# Patient Record
Sex: MALE | Race: OTHER | HISPANIC OR LATINO | URBAN - METROPOLITAN AREA
[De-identification: names, ages, dates, MRNs, and addresses within clinical notes are randomized per-mention and may not be internally consistent; named-entity substitution may affect disease eponyms.]

---

## 2020-03-23 ENCOUNTER — EMERGENCY (EMERGENCY)
Facility: HOSPITAL | Age: 60
LOS: 0 days | Discharge: HOME | End: 2020-03-23
Payer: SELF-PAY

## 2020-03-23 VITALS
DIASTOLIC BLOOD PRESSURE: 76 MMHG | OXYGEN SATURATION: 95 % | HEART RATE: 93 BPM | RESPIRATION RATE: 18 BRPM | SYSTOLIC BLOOD PRESSURE: 135 MMHG | TEMPERATURE: 100 F

## 2020-03-23 DIAGNOSIS — R09.89 OTHER SPECIFIED SYMPTOMS AND SIGNS INVOLVING THE CIRCULATORY AND RESPIRATORY SYSTEMS: ICD-10-CM

## 2020-03-23 DIAGNOSIS — R05 COUGH: ICD-10-CM

## 2020-03-23 DIAGNOSIS — B97.29 OTHER CORONAVIRUS AS THE CAUSE OF DISEASES CLASSIFIED ELSEWHERE: ICD-10-CM

## 2020-03-23 DIAGNOSIS — Z88.0 ALLERGY STATUS TO PENICILLIN: ICD-10-CM

## 2020-03-23 DIAGNOSIS — R50.9 FEVER, UNSPECIFIED: ICD-10-CM

## 2020-03-23 DIAGNOSIS — J06.9 ACUTE UPPER RESPIRATORY INFECTION, UNSPECIFIED: ICD-10-CM

## 2020-03-23 PROCEDURE — 99283 EMERGENCY DEPT VISIT LOW MDM: CPT

## 2020-03-23 PROCEDURE — U0001: CPT

## 2020-03-23 NOTE — ED STATDOCS - NS ED ROS FT
ROS: Constitutional- +fever, +chills.  Respiratory- +cough, -SOB  Cardiac- no chest pain, no palpitations, ENT- +rhinorrhea, no sore throat, no congestion.  Abdomen- No nausea, no vomiting, no diarrhea.  Urinary- no dysuria, no urgency, no frequency.  Skin- No rashes

## 2020-03-23 NOTE — ED STATDOCS - PATIENT PORTAL LINK FT
You can access the FollowMyHealth Patient Portal offered by [facility] by registering at the following website: [email or web address]. By joining [employer]’s FollowMyHealth portal, you will also be able to view your health information using other applications (apps) compatible with our system.

## 2020-03-23 NOTE — ED STATDOCS - NSFOLLOWUPINSTRUCTIONS_ED_ALL_ED_FT
How to get your Coronavirus (COVID-19) Testing Results:   Please be advised that you were tested for the coronavirus (COVID-19) in the Emergency Department at [facility].  You are to maintain self-quarantine procedures for 14 days until instructed otherwise by one of our healthcare agents. Please note that the test may take up to 2-4 days to result.  If you do not hear from us within 72 hours and you'd like to check on your results, you can call on of our coronavirus specialists at [address] [place] [address] (available 24/7).  Please DO NOT call the site where you received the test to obtain your results.     Return to the ED for worsening sx including SOB

## 2020-03-23 NOTE — ED STATDOCS - OBJECTIVE STATEMENT
Pt presents to ED with fever, cough, runny nose, body aches, sore throat x 4 days. Pt recently exposed to COVID-19. Pt here for testing.

## 2020-03-24 LAB — SARS-COV-2 RNA SPEC QL NAA+PROBE: DETECTED

## 2020-07-09 ENCOUNTER — EMERGENCY (EMERGENCY)
Facility: HOSPITAL | Age: 60
LOS: 0 days | Discharge: HOME | End: 2020-07-09
Attending: EMERGENCY MEDICINE
Payer: SELF-PAY

## 2020-07-09 VITALS
HEART RATE: 74 BPM | DIASTOLIC BLOOD PRESSURE: 76 MMHG | OXYGEN SATURATION: 97 % | TEMPERATURE: 98 F | RESPIRATION RATE: 18 BRPM | SYSTOLIC BLOOD PRESSURE: 134 MMHG

## 2020-07-09 VITALS — WEIGHT: 179.9 LBS

## 2020-07-09 DIAGNOSIS — R07.89 OTHER CHEST PAIN: ICD-10-CM

## 2020-07-09 DIAGNOSIS — R11.2 NAUSEA WITH VOMITING, UNSPECIFIED: ICD-10-CM

## 2020-07-09 DIAGNOSIS — Z88.0 ALLERGY STATUS TO PENICILLIN: ICD-10-CM
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
## 2020-07-09 DIAGNOSIS — E11.9 TYPE 2 DIABETES MELLITUS WITHOUT COMPLICATIONS: ICD-10-CM
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
## 2020-07-09 DIAGNOSIS — Z11.59 ENCOUNTER FOR SCREENING FOR OTHER VIRAL DISEASES: ICD-10-CM

## 2020-07-09 DIAGNOSIS — I10 ESSENTIAL (PRIMARY) HYPERTENSION: ICD-10-CM

## 2020-07-09 DIAGNOSIS — T18.9XXA FOREIGN BODY OF ALIMENTARY TRACT, PART UNSPECIFIED, INITIAL ENCOUNTER: ICD-10-CM

## 2020-07-09 LAB
ALBUMIN SERPL ELPH-MCNC: 3.8 G/DL — SIGNIFICANT CHANGE UP (ref 3.3–5)
ALP SERPL-CCNC: 94 U/L — SIGNIFICANT CHANGE UP (ref 40–120)
ALT FLD-CCNC: 24 U/L — SIGNIFICANT CHANGE UP (ref 12–78)
ANION GAP SERPL CALC-SCNC: 5 MMOL/L — SIGNIFICANT CHANGE UP (ref 5–17)
AST SERPL-CCNC: 17 U/L — SIGNIFICANT CHANGE UP (ref 15–37)
BASOPHILS # BLD AUTO: 0.02 K/UL — SIGNIFICANT CHANGE UP (ref 0–0.2)
BASOPHILS NFR BLD AUTO: 0.3 % — SIGNIFICANT CHANGE UP (ref 0–2)
BILIRUB SERPL-MCNC: 0.4 MG/DL — SIGNIFICANT CHANGE UP (ref 0.2–1.2)
BUN SERPL-MCNC: 13 MG/DL — SIGNIFICANT CHANGE UP (ref 7–23)
CALCIUM SERPL-MCNC: 9.3 MG/DL — SIGNIFICANT CHANGE UP (ref 8.5–10.1)
CHLORIDE SERPL-SCNC: 106 MMOL/L — SIGNIFICANT CHANGE UP (ref 96–108)
CO2 SERPL-SCNC: 27 MMOL/L — SIGNIFICANT CHANGE UP (ref 22–31)
CREAT SERPL-MCNC: 0.91 MG/DL — SIGNIFICANT CHANGE UP (ref 0.5–1.3)
EOSINOPHIL # BLD AUTO: 0.03 K/UL — SIGNIFICANT CHANGE UP (ref 0–0.5)
EOSINOPHIL NFR BLD AUTO: 0.5 % — SIGNIFICANT CHANGE UP (ref 0–6)
GLUCOSE SERPL-MCNC: 313 MG/DL — HIGH (ref 70–99)
HCT VFR BLD CALC: 50.8 % — HIGH (ref 39–50)
HGB BLD-MCNC: 17.5 G/DL — HIGH (ref 13–17)
IMM GRANULOCYTES NFR BLD AUTO: 0.2 % — SIGNIFICANT CHANGE UP (ref 0–1.5)
LYMPHOCYTES # BLD AUTO: 1.23 K/UL — SIGNIFICANT CHANGE UP (ref 1–3.3)
LYMPHOCYTES # BLD AUTO: 19.3 % — SIGNIFICANT CHANGE UP (ref 13–44)
MCHC RBC-ENTMCNC: 30.9 PG — SIGNIFICANT CHANGE UP (ref 27–34)
MCHC RBC-ENTMCNC: 34.4 GM/DL — SIGNIFICANT CHANGE UP (ref 32–36)
MCV RBC AUTO: 89.6 FL — SIGNIFICANT CHANGE UP (ref 80–100)
MONOCYTES # BLD AUTO: 0.38 K/UL — SIGNIFICANT CHANGE UP (ref 0–0.9)
MONOCYTES NFR BLD AUTO: 6 % — SIGNIFICANT CHANGE UP (ref 2–14)
NEUTROPHILS # BLD AUTO: 4.69 K/UL — SIGNIFICANT CHANGE UP (ref 1.8–7.4)
NEUTROPHILS NFR BLD AUTO: 73.7 % — SIGNIFICANT CHANGE UP (ref 43–77)
PLATELET # BLD AUTO: 196 K/UL — SIGNIFICANT CHANGE UP (ref 150–400)
POTASSIUM SERPL-MCNC: 4.2 MMOL/L — SIGNIFICANT CHANGE UP (ref 3.5–5.3)
POTASSIUM SERPL-SCNC: 4.2 MMOL/L — SIGNIFICANT CHANGE UP (ref 3.5–5.3)
PROT SERPL-MCNC: 7.8 GM/DL — SIGNIFICANT CHANGE UP (ref 6–8.3)
RBC # BLD: 5.67 M/UL — SIGNIFICANT CHANGE UP (ref 4.2–5.8)
RBC # FLD: 11.9 % — SIGNIFICANT CHANGE UP (ref 10.3–14.5)
SARS-COV-2 RNA SPEC QL NAA+PROBE: SIGNIFICANT CHANGE UP
SODIUM SERPL-SCNC: 138 MMOL/L — SIGNIFICANT CHANGE UP (ref 135–145)
WBC # BLD: 6.36 K/UL — SIGNIFICANT CHANGE UP (ref 3.8–10.5)
WBC # FLD AUTO: 6.36 K/UL — SIGNIFICANT CHANGE UP (ref 3.8–10.5)

## 2020-07-09 PROCEDURE — 96375 TX/PRO/DX INJ NEW DRUG ADDON: CPT

## 2020-07-09 PROCEDURE — 96361 HYDRATE IV INFUSION ADD-ON: CPT

## 2020-07-09 PROCEDURE — 99284 EMERGENCY DEPT VISIT MOD MDM: CPT

## 2020-07-09 PROCEDURE — 93005 ELECTROCARDIOGRAM TRACING: CPT

## 2020-07-09 PROCEDURE — 80053 COMPREHEN METABOLIC PANEL: CPT

## 2020-07-09 PROCEDURE — 96374 THER/PROPH/DIAG INJ IV PUSH: CPT

## 2020-07-09 PROCEDURE — 36415 COLL VENOUS BLD VENIPUNCTURE: CPT

## 2020-07-09 PROCEDURE — 87635 SARS-COV-2 COVID-19 AMP PRB: CPT

## 2020-07-09 PROCEDURE — 99284 EMERGENCY DEPT VISIT MOD MDM: CPT | Mod: 25

## 2020-07-09 PROCEDURE — 85025 COMPLETE CBC W/AUTO DIFF WBC: CPT

## 2020-07-09 PROCEDURE — 93010 ELECTROCARDIOGRAM REPORT: CPT

## 2020-07-09 RX ORDER — GLUCAGON INJECTION, SOLUTION 0.5 MG/.1ML
1 INJECTION, SOLUTION SUBCUTANEOUS ONCE
Refills: 0 | Status: COMPLETED | OUTPATIENT
Start: 2020-07-09 | End: 2020-07-09

## 2020-07-09 RX ORDER — SODIUM CHLORIDE 9 MG/ML
1000 INJECTION INTRAMUSCULAR; INTRAVENOUS; SUBCUTANEOUS ONCE
Refills: 0 | Status: COMPLETED | OUTPATIENT
Start: 2020-07-09 | End: 2020-07-09

## 2020-07-09 RX ORDER — ONDANSETRON 8 MG/1
4 TABLET, FILM COATED ORAL ONCE
Refills: 0 | Status: COMPLETED | OUTPATIENT
Start: 2020-07-09 | End: 2020-07-09

## 2020-07-09 RX ADMIN — ONDANSETRON 4 MILLIGRAM(S): 8 TABLET, FILM COATED ORAL at 17:19

## 2020-07-09 RX ADMIN — SODIUM CHLORIDE 2000 MILLILITER(S): 9 INJECTION INTRAMUSCULAR; INTRAVENOUS; SUBCUTANEOUS at 17:23

## 2020-07-09 RX ADMIN — GLUCAGON INJECTION, SOLUTION 1 MILLIGRAM(S): 0.5 INJECTION, SOLUTION SUBCUTANEOUS at 17:00

## 2020-07-09 RX ADMIN — SODIUM CHLORIDE 1000 MILLILITER(S): 9 INJECTION INTRAMUSCULAR; INTRAVENOUS; SUBCUTANEOUS at 18:08

## 2020-07-09 NOTE — ED ADULT NURSE NOTE - NS_ED_NURSE_TEACHING_TOPIC_ED_A_ED
Other specify/Pt. D/C as ordered. Pt. states to feel much better at this time. Pt. verbalizes understanding of F/U at [facility] for Diabetes management. Safety maintained

## 2020-07-09 NOTE — ED STATDOCS - NSFOLLOWUPINSTRUCTIONS_ED_ALL_ED_FT
Please follow up with your Primary MD in 1-2 days. Follow up with GI as soon as possible. Return to ED immediately for any new or concerning symptoms or worsening symptoms.     Ingestión de cuerpos extraños en los adultos  Swallowed Foreign Body, Adult     La ingestión de un cuerpo extraño es el acto de tragar un objeto que queda atascado en el tubo digestivo, ya sea en la parte del organismo que mueve los alimentos desde la boca hasta el estómago (esófago) o en otra parte. Cuando zane persona ingiere un objeto, llega hasta el esófago. El lugar más estrecho del aparato digestivo es el punto donde el esófago se une con el estómago. Si el objeto puede atravesar latanya lugar, generalmente avanzará a través del dario del aparato digestivo sin causar problemas. Si un cuerpo extraño queda atascado, ivan vez sea necesario extraerlo.  Zane persona puede ingerir un cuerpo extraño de manera accidental o deliberada. Es muy importante que le informe al médico qué es lo que badillo ingerido. Algunos objetos que se tragan pueden ser potencialmente mortales y puede ser necesario un tratamiento de emergencia. Los cuerpos extraños peligrosos que se ingieren incluyen lo siguiente:  Objetos que quedan atascados en la garganta.Objetos que impiden que pueda tragar.Objetos que dificultan la respiración.Objetos filosos.Objetos nocivos o venenosos (tóxicos), lyndsay las pilas o las drogas ilegales.¿Cuáles son las causas?  El cuerpo extraño que se ingiere con mayor frecuencia es comida que no pasará a través del esófago hasta llegar al estómago (impactación alimentaria). Habitualmente, entre los alimentos que quedan retenidos se incluyen las denise y los vegetales duros, lyndsay las zanahorias y los rábanos.  Otros cuerpos extraños que se ingieren con frecuencia incluyen lo siguiente:  Trozos de hueso de la carne o espinas del pescado.Palillos de dientes.Dentaduras postizas.¿Qué incrementa el riesgo?  Es más probable que ingieran un cuerpo extraño las personas que:  Usan dentaduras postizas.Bravo tomado alcohol o consumido drogas.Tienen zane afección de malathi mental.Tienen dificultades con el pensamiento y el aprendizaje (deterioro cognitivo).Tienen zane mignon estrecha o con tejido cicatricial en el tubo digestivo.¿Cuáles son los signos o los síntomas?  Los síntomas de esta afección incluyen:  Dolor u opresión en la garganta o en el pecho.Imposibilidad de tragar alimentos o líquidos.Imposibilidad de tragar la saliva.Babeo.Asfixia.Voz ronca.Dificultad para respirar.Respiración ruidosa.Vómitos con shahnaz.¿Cómo se diagnostica?  Esta afección puede diagnosticarse en función de los síntomas y los antecedentes médicos. El médico hará un examen físico para confirmar el diagnóstico y encontrar el objeto. Se puede usar un detector de metales para hallar los objetos de metal. También pueden realizarse estudios de diagnóstico por imágenes, entre ellos:  Radiografías.Zane exploración por tomografía computarizada (TC).Es posible que algunos objetos no se visualicen en los estudios de diagnóstico por imágenes. En esos casos, se puede realizar un examen o procedimiento en el que se usa un instrumento para observar el interior del esófago (endoscopio). El tubo (endoscopio) que se emplea para ronny examen puede ser rígido o flexible, en función del lugar donde está atascado el cuerpo extraño.  ¿Cómo se trata?  Generalmente, un objeto que ha llegado hasta el estómago xochitl no es peligroso atravesará el aparato digestivo sin tratamiento. Si el objeto ingerido no es peligroso xochitl está atascado en el esófago:  El médico puede aspirarlo suavemente y sacarlo a través de la boca.Pueden administrarle medicamentos para relajar los músculos del esófago y permitir el paso del objeto.Se puede realizar zane endoscopia para encontrar y extraer el objeto si no pasa con medicamentos. El médico introducirá los instrumentos médicos a través del endoscopio para extraer el objeto. Ivan vez deba recibir tratamiento de emergencia en los siguientes casos:  Si el objeto se encuentra en el esófago y produce la inhalación de saliva hacia los pulmones (aspiración).Si el objeto se encuentra en el esófago y oprime las vías respiratorias. Hercules dificulta la respiración.Si el objeto puede dañar el tubo digestivo. Algunos de los objetos que pueden causar daños incluyen las pilas, los imanes, los objetos filosos y las drogas.Siga estas instrucciones en arthur casa:  Cómo cuidarse usted     Si se prevé que el objeto que se encuentra en el aparato digestivo pase:  Siga comiendo lo que come habitualmente, a menos que el médico le indique otra cosa.Revise las heces después de cada deposición para herberth si ha eliminado el objeto.Comuníquese con el médico si no ha eliminado el objeto después de 3 días.Si le hicieron un procedimiento endoscópico para retirar el cuerpo extraño, siga las instrucciones del médico acerca de cómo cuidarse después del procedimiento.Instrucciones generales     [place] los medicamentos de venta saima y los recetados solamente lyndsay se lo haya indicado el médico.Concurra a todas las visitas de seguimiento y repita los estudios de diagnóstico por imágenes lyndsay se lo haya indicado el médico. Hercules es importante.¿Cómo se adriel?  Ingrid los alimentos en trozos pequeños.Siempre siéntese derecho mientras come.Quite los huesos de la carne y las espinas del pescado.Mastique marely los alimentos antes de tragarlos.No hable, se ría ni camine mientras come o traga.Comuníquese con un médico si:  Sigue teniendo síntomas de sabrina ingerido un cuerpo extraño.No ha eliminado el objeto después de 3 días.Solicite ayuda inmediatamente si:  Tiene fiebre.Siente dolor en el pecho o en el abdomen.Tose con shahnaz.Observa shahnaz en la materia fecal (heces).Tiene shahnaz en el vómito después del tratamiento.Resumen  La ingestión de un cuerpo extraño es el acto de tragar un objeto que queda atascado en el tubo digestivo, ya sea en la parte del organismo que mueve los alimentos desde la boca hasta el estómago (esófago) o en otra parte.Generalmente, un objeto que ha llegado hasta el estómago xochitl no es peligroso atravesará el aparato digestivo sin tratamiento.Se puede realizar zane endoscopia para encontrar y extraer el objeto.Si se prevé que el objeto atascado en el aparato digestivo se expulsará, comuníquese con el médico si no ha eliminado el objeto después de 3 días.Solicite ayuda de inmediato si observa shahnaz en la materia fecal (heces) o cuando tose o vomita.Esta información no tiene lyndsay fin reemplazar el consejo del médico. Asegúrese de hacerle al médico cualquier pregunta que tenga.

## 2020-07-09 NOTE — ED ADULT TRIAGE NOTE - CHIEF COMPLAINT QUOTE
pt c/o  chest pain with SOB/difficutly breathing as well as abdominal pain  and NV since this afternoon, pt denies diarrhea fever fall trauma,  , pt ahs hx of daibetes no cardiac hx per family

## 2020-07-09 NOTE — ED ADULT NURSE NOTE - NSIMPLEMENTINTERV_GEN_ALL_ED
Implemented All Universal Safety Interventions:  [place] to call system. Call bell, personal items and telephone within reach. Instruct patient to call for assistance. Room bathroom lighting operational. Non-slip footwear when patient is off stretcher. Physically safe environment: no spills, clutter or unnecessary equipment. Stretcher in lowest position, wheels locked, appropriate side rails in place.

## 2020-07-09 NOTE — ED STATDOCS - PATIENT PORTAL LINK FT
You can access the FollowMyHealth Patient Portal offered by [facility] by registering at the following website: [email or web address]. By joining [employer]’s FollowMyHealth portal, you will also be able to view your health information using other applications (apps) compatible with our system.

## 2020-07-09 NOTE — ED STATDOCS - NSFOLLOWUPCLINICS_GEN_ALL_ED_FT
[facility]  Family Medicine  [address] [place] [address]  Phone: [phone]  Fax:   Follow Up Time: Urgent

## 2020-07-09 NOTE — ED STATDOCS - CARE PROVIDERS DIRECT ADDRESSES
[email or web address]

## 2020-07-09 NOTE — ED STATDOCS - PROGRESS NOTE DETAILS
61 y/o M presents with CP. pt states he was eating carne asada at 1230 and it became stuck. Has been unable to tolerate his secretions since. No other complaints at this time. -Petr Maynard PA-C Spoke with Dr. Velásquez, will take to endoscopy if patient if sx not relieved with glucagon. -Petr Maynard PA-C pt feeling much better after administration of glucagon, tolerating PO, no further episodes of emesis. Will dc with JONATHON BARRIOS. -Petr Maynard PA-C

## 2020-07-09 NOTE — ED STATDOCS - CARE PROVIDER_API CALL
Yvonne Velásquez  GASTROENTEROLOGY  [address] [place] [address]  Phone: [phone]  Fax: [phone]  Follow Up Time: 7-10 Days

## 2020-07-09 NOTE — ED STATDOCS - OBJECTIVE STATEMENT
61 y/o M with h/o DM and HTN p/w midsternal chest discomfort, nausea and vomiting after eating tej baeza at 12:30 this afternoon.  He notes that the food became stuck immediately and he couldn't tolerate his own secretions.  Pt denies f/c/r, cough. He was + for Covid 19 in March 2020.

## 2020-07-09 NOTE — ED ADULT NURSE NOTE - OBJECTIVE STATEMENT
Pt. to the ED C/O Meat Stuck in Throat- IV and Labs as ordered. Will cont to monitor patient closely- Safety maintained

## 2020-07-13 PROBLEM — Z78.9 OTHER SPECIFIED HEALTH STATUS: Chronic | Status: ACTIVE | Noted: 2020-03-23

## 2021-10-10 ENCOUNTER — INPATIENT (INPATIENT)
Facility: HOSPITAL | Age: 61
LOS: 0 days | Discharge: HOME | DRG: 395 | End: 2021-10-11
Attending: INTERNAL MEDICINE | Admitting: INTERNAL MEDICINE
Payer: MEDICARE

## 2021-10-10 ENCOUNTER — RESULT REVIEW (OUTPATIENT)
Age: 61
End: 2021-10-10

## 2021-10-10 VITALS
TEMPERATURE: 99 F | OXYGEN SATURATION: 98 % | SYSTOLIC BLOOD PRESSURE: 185 MMHG | RESPIRATION RATE: 18 BRPM | HEART RATE: 104 BPM | DIASTOLIC BLOOD PRESSURE: 94 MMHG
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
## 2021-10-10 DIAGNOSIS — Z88.0 ALLERGY STATUS TO PENICILLIN: ICD-10-CM

## 2021-10-10 DIAGNOSIS — E11.9 TYPE 2 DIABETES MELLITUS WITHOUT COMPLICATIONS: ICD-10-CM
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
## 2021-10-10 DIAGNOSIS — R13.10 DYSPHAGIA, UNSPECIFIED: ICD-10-CM
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
## 2021-10-10 DIAGNOSIS — T18.128A FOOD IN ESOPHAGUS CAUSING OTHER INJURY, INITIAL ENCOUNTER: ICD-10-CM

## 2021-10-10 DIAGNOSIS — T17.208A UNSPECIFIED FOREIGN BODY IN PHARYNX CAUSING OTHER INJURY, INITIAL ENCOUNTER: ICD-10-CM

## 2021-10-10 DIAGNOSIS — K20.90 ESOPHAGITIS, UNSPECIFIED WITHOUT BLEEDING: ICD-10-CM

## 2021-10-10 LAB
ALBUMIN SERPL ELPH-MCNC: 4 G/DL — SIGNIFICANT CHANGE UP (ref 3.3–5)
ALP SERPL-CCNC: 91 U/L — SIGNIFICANT CHANGE UP (ref 40–120)
ALT FLD-CCNC: 23 U/L — SIGNIFICANT CHANGE UP (ref 12–78)
ANION GAP SERPL CALC-SCNC: 6 MMOL/L — SIGNIFICANT CHANGE UP (ref 5–17)
APTT BLD: 30.7 SEC — SIGNIFICANT CHANGE UP (ref 27.5–35.5)
AST SERPL-CCNC: 11 U/L — LOW (ref 15–37)
BASOPHILS # BLD AUTO: 0.04 K/UL — SIGNIFICANT CHANGE UP (ref 0–0.2)
BASOPHILS NFR BLD AUTO: 0.4 % — SIGNIFICANT CHANGE UP (ref 0–2)
BILIRUB SERPL-MCNC: 0.7 MG/DL — SIGNIFICANT CHANGE UP (ref 0.2–1.2)
BUN SERPL-MCNC: 11 MG/DL — SIGNIFICANT CHANGE UP (ref 7–23)
CALCIUM SERPL-MCNC: 9.6 MG/DL — SIGNIFICANT CHANGE UP (ref 8.5–10.1)
CHLORIDE SERPL-SCNC: 104 MMOL/L — SIGNIFICANT CHANGE UP (ref 96–108)
CO2 SERPL-SCNC: 26 MMOL/L — SIGNIFICANT CHANGE UP (ref 22–31)
CREAT SERPL-MCNC: 0.95 MG/DL — SIGNIFICANT CHANGE UP (ref 0.5–1.3)
EOSINOPHIL # BLD AUTO: 0.01 K/UL — SIGNIFICANT CHANGE UP (ref 0–0.5)
EOSINOPHIL NFR BLD AUTO: 0.1 % — SIGNIFICANT CHANGE UP (ref 0–6)
GLUCOSE BLDC GLUCOMTR-MCNC: 243 MG/DL — HIGH (ref 70–99)
GLUCOSE BLDC GLUCOMTR-MCNC: 263 MG/DL — HIGH (ref 70–99)
GLUCOSE SERPL-MCNC: 295 MG/DL — HIGH (ref 70–99)
HCT VFR BLD CALC: 50 % — SIGNIFICANT CHANGE UP (ref 39–50)
HGB BLD-MCNC: 17.2 G/DL — HIGH (ref 13–17)
IMM GRANULOCYTES NFR BLD AUTO: 0.2 % — SIGNIFICANT CHANGE UP (ref 0–1.5)
INR BLD: 0.96 RATIO — SIGNIFICANT CHANGE UP (ref 0.88–1.16)
LYMPHOCYTES # BLD AUTO: 1.41 K/UL — SIGNIFICANT CHANGE UP (ref 1–3.3)
LYMPHOCYTES # BLD AUTO: 15 % — SIGNIFICANT CHANGE UP (ref 13–44)
MCHC RBC-ENTMCNC: 30.7 PG — SIGNIFICANT CHANGE UP (ref 27–34)
MCHC RBC-ENTMCNC: 34.4 GM/DL — SIGNIFICANT CHANGE UP (ref 32–36)
MCV RBC AUTO: 89.1 FL — SIGNIFICANT CHANGE UP (ref 80–100)
MONOCYTES # BLD AUTO: 0.43 K/UL — SIGNIFICANT CHANGE UP (ref 0–0.9)
MONOCYTES NFR BLD AUTO: 4.6 % — SIGNIFICANT CHANGE UP (ref 2–14)
NEUTROPHILS # BLD AUTO: 7.49 K/UL — HIGH (ref 1.8–7.4)
NEUTROPHILS NFR BLD AUTO: 79.7 % — HIGH (ref 43–77)
PLATELET # BLD AUTO: 240 K/UL — SIGNIFICANT CHANGE UP (ref 150–400)
POTASSIUM SERPL-MCNC: 4.3 MMOL/L — SIGNIFICANT CHANGE UP (ref 3.5–5.3)
POTASSIUM SERPL-SCNC: 4.3 MMOL/L — SIGNIFICANT CHANGE UP (ref 3.5–5.3)
PROT SERPL-MCNC: 8 GM/DL — SIGNIFICANT CHANGE UP (ref 6–8.3)
PROTHROM AB SERPL-ACNC: 11.2 SEC — SIGNIFICANT CHANGE UP (ref 10.6–13.6)
RBC # BLD: 5.61 M/UL — SIGNIFICANT CHANGE UP (ref 4.2–5.8)
RBC # FLD: 11.9 % — SIGNIFICANT CHANGE UP (ref 10.3–14.5)
SARS-COV-2 RNA SPEC QL NAA+PROBE: SIGNIFICANT CHANGE UP
SODIUM SERPL-SCNC: 136 MMOL/L — SIGNIFICANT CHANGE UP (ref 135–145)
WBC # BLD: 9.4 K/UL — SIGNIFICANT CHANGE UP (ref 3.8–10.5)
WBC # FLD AUTO: 9.4 K/UL — SIGNIFICANT CHANGE UP (ref 3.8–10.5)

## 2021-10-10 PROCEDURE — 88305 TISSUE EXAM BY PATHOLOGIST: CPT | Mod: 26

## 2021-10-10 PROCEDURE — 85025 COMPLETE CBC W/AUTO DIFF WBC: CPT

## 2021-10-10 PROCEDURE — 93005 ELECTROCARDIOGRAM TRACING: CPT

## 2021-10-10 PROCEDURE — 88312 SPECIAL STAINS GROUP 1: CPT | Mod: 26

## 2021-10-10 PROCEDURE — 88312 SPECIAL STAINS GROUP 1: CPT

## 2021-10-10 PROCEDURE — C1773: CPT

## 2021-10-10 PROCEDURE — 86901 BLOOD TYPING SEROLOGIC RH(D): CPT

## 2021-10-10 PROCEDURE — 85610 PROTHROMBIN TIME: CPT

## 2021-10-10 PROCEDURE — 88305 TISSUE EXAM BY PATHOLOGIST: CPT

## 2021-10-10 PROCEDURE — 88313 SPECIAL STAINS GROUP 2: CPT | Mod: 26

## 2021-10-10 PROCEDURE — 99285 EMERGENCY DEPT VISIT HI MDM: CPT

## 2021-10-10 PROCEDURE — 86900 BLOOD TYPING SEROLOGIC ABO: CPT

## 2021-10-10 PROCEDURE — 86850 RBC ANTIBODY SCREEN: CPT

## 2021-10-10 PROCEDURE — 83036 HEMOGLOBIN GLYCOSYLATED A1C: CPT

## 2021-10-10 PROCEDURE — 86769 SARS-COV-2 COVID-19 ANTIBODY: CPT

## 2021-10-10 PROCEDURE — 80053 COMPREHEN METABOLIC PANEL: CPT

## 2021-10-10 PROCEDURE — 99222 1ST HOSP IP/OBS MODERATE 55: CPT

## 2021-10-10 PROCEDURE — 43239 EGD BIOPSY SINGLE/MULTIPLE: CPT | Mod: 1L

## 2021-10-10 PROCEDURE — 85730 THROMBOPLASTIN TIME PARTIAL: CPT

## 2021-10-10 PROCEDURE — 93010 ELECTROCARDIOGRAM REPORT: CPT

## 2021-10-10 PROCEDURE — 43247 EGD REMOVE FOREIGN BODY: CPT | Mod: 1L

## 2021-10-10 PROCEDURE — 86803 HEPATITIS C AB TEST: CPT

## 2021-10-10 PROCEDURE — 36415 COLL VENOUS BLD VENIPUNCTURE: CPT

## 2021-10-10 PROCEDURE — 82962 GLUCOSE BLOOD TEST: CPT

## 2021-10-10 PROCEDURE — 88313 SPECIAL STAINS GROUP 2: CPT

## 2021-10-10 RX ORDER — DEXTROSE 50 % IN WATER 50 %
25 SYRINGE (ML) INTRAVENOUS ONCE
Refills: 0 | Status: DISCONTINUED | OUTPATIENT
Start: 2021-10-10 | End: 2021-10-11

## 2021-10-10 RX ORDER — INSULIN LISPRO 100/ML
2 VIAL (ML) SUBCUTANEOUS ONCE
Refills: 0 | Status: COMPLETED | OUTPATIENT
Start: 2021-10-10 | End: 2021-10-10

## 2021-10-10 RX ORDER — ACETAMINOPHEN 500 MG
650 TABLET ORAL EVERY 6 HOURS
Refills: 0 | Status: DISCONTINUED | OUTPATIENT
Start: 2021-10-10 | End: 2021-10-11

## 2021-10-10 RX ORDER — SODIUM CHLORIDE 9 MG/ML
1000 INJECTION, SOLUTION INTRAVENOUS
Refills: 0 | Status: DISCONTINUED | OUTPATIENT
Start: 2021-10-10 | End: 2021-10-10

## 2021-10-10 RX ORDER — SUCRALFATE 1 G
1 TABLET ORAL
Refills: 0 | Status: DISCONTINUED | OUTPATIENT
Start: 2021-10-10 | End: 2021-10-11

## 2021-10-10 RX ORDER — SODIUM CHLORIDE 9 MG/ML
1000 INJECTION, SOLUTION INTRAVENOUS
Refills: 0 | Status: DISCONTINUED | OUTPATIENT
Start: 2021-10-10 | End: 2021-10-11

## 2021-10-10 RX ORDER — ONDANSETRON 8 MG/1
4 TABLET, FILM COATED ORAL ONCE
Refills: 0 | Status: DISCONTINUED | OUTPATIENT
Start: 2021-10-10 | End: 2021-10-10

## 2021-10-10 RX ORDER — GLUCAGON INJECTION, SOLUTION 0.5 MG/.1ML
1 INJECTION, SOLUTION SUBCUTANEOUS ONCE
Refills: 0 | Status: COMPLETED | OUTPATIENT
Start: 2021-10-10 | End: 2021-10-10

## 2021-10-10 RX ORDER — SODIUM CHLORIDE 9 MG/ML
1000 INJECTION INTRAMUSCULAR; INTRAVENOUS; SUBCUTANEOUS
Refills: 0 | Status: DISCONTINUED | OUTPATIENT
Start: 2021-10-10 | End: 2021-10-11

## 2021-10-10 RX ORDER — DEXTROSE 50 % IN WATER 50 %
12.5 SYRINGE (ML) INTRAVENOUS ONCE
Refills: 0 | Status: DISCONTINUED | OUTPATIENT
Start: 2021-10-10 | End: 2021-10-11

## 2021-10-10 RX ORDER — DEXTROSE 50 % IN WATER 50 %
15 SYRINGE (ML) INTRAVENOUS ONCE
Refills: 0 | Status: DISCONTINUED | OUTPATIENT
Start: 2021-10-10 | End: 2021-10-11

## 2021-10-10 RX ORDER — ONDANSETRON 8 MG/1
4 TABLET, FILM COATED ORAL ONCE
Refills: 0 | Status: COMPLETED | OUTPATIENT
Start: 2021-10-10 | End: 2021-10-10

## 2021-10-10 RX ORDER — CHOLECALCIFEROL (VITAMIN D3) 125 MCG
1 CAPSULE ORAL
Qty: 0 | Refills: 0 | DISCHARGE

## 2021-10-10 RX ORDER — SODIUM CHLORIDE 9 MG/ML
1000 INJECTION, SOLUTION INTRAVENOUS ONCE
Refills: 0 | Status: COMPLETED | OUTPATIENT
Start: 2021-10-10 | End: 2021-10-10

## 2021-10-10 RX ORDER — ONDANSETRON 8 MG/1
4 TABLET, FILM COATED ORAL EVERY 8 HOURS
Refills: 0 | Status: DISCONTINUED | OUTPATIENT
Start: 2021-10-10 | End: 2021-10-11

## 2021-10-10 RX ORDER — LANOLIN ALCOHOL/MO/W.PET/CERES
3 CREAM (GRAM) TOPICAL AT BEDTIME
Refills: 0 | Status: DISCONTINUED | OUTPATIENT
Start: 2021-10-10 | End: 2021-10-11

## 2021-10-10 RX ORDER — GLUCAGON INJECTION, SOLUTION 0.5 MG/.1ML
1 INJECTION, SOLUTION SUBCUTANEOUS ONCE
Refills: 0 | Status: DISCONTINUED | OUTPATIENT
Start: 2021-10-10 | End: 2021-10-11

## 2021-10-10 RX ORDER — CHOLECALCIFEROL (VITAMIN D3) 125 MCG
1000 CAPSULE ORAL DAILY
Refills: 0 | Status: DISCONTINUED | OUTPATIENT
Start: 2021-10-10 | End: 2021-10-11

## 2021-10-10 RX ORDER — INSULIN LISPRO 100/ML
VIAL (ML) SUBCUTANEOUS
Refills: 0 | Status: DISCONTINUED | OUTPATIENT
Start: 2021-10-10 | End: 2021-10-11

## 2021-10-10 RX ORDER — PANTOPRAZOLE SODIUM 20 MG/1
40 TABLET, DELAYED RELEASE ORAL
Refills: 0 | Status: DISCONTINUED | OUTPATIENT
Start: 2021-10-10 | End: 2021-10-11

## 2021-10-10 RX ORDER — FENTANYL CITRATE 50 UG/ML
25 INJECTION INTRAVENOUS
Refills: 0 | Status: DISCONTINUED | OUTPATIENT
Start: 2021-10-10 | End: 2021-10-10

## 2021-10-10 RX ADMIN — Medication 1000 UNIT(S): at 22:21

## 2021-10-10 RX ADMIN — GLUCAGON INJECTION, SOLUTION 1 MILLIGRAM(S): 0.5 INJECTION, SOLUTION SUBCUTANEOUS at 17:03

## 2021-10-10 RX ADMIN — SODIUM CHLORIDE 1000 MILLILITER(S): 9 INJECTION, SOLUTION INTRAVENOUS at 17:02

## 2021-10-10 RX ADMIN — Medication 2 UNIT(S): at 20:30

## 2021-10-10 RX ADMIN — ONDANSETRON 4 MILLIGRAM(S): 8 TABLET, FILM COATED ORAL at 17:02

## 2021-10-10 RX ADMIN — Medication 2: at 22:24

## 2021-10-10 RX ADMIN — Medication 1 GRAM(S): at 22:21

## 2021-10-10 RX ADMIN — PANTOPRAZOLE SODIUM 40 MILLIGRAM(S): 20 TABLET, DELAYED RELEASE ORAL at 22:21

## 2021-10-10 RX ADMIN — SODIUM CHLORIDE 75 MILLILITER(S): 9 INJECTION, SOLUTION INTRAVENOUS at 20:37

## 2021-10-10 NOTE — ED STATDOCS - ATTENDING CONTRIBUTION TO CARE
I, Rhonda Sánchez DO,  performed the initial face to face bedside interview with this patient regarding history of present illness, review of symptoms and relevant past medical, social and family history.  I completed an independent physical examination.  I was the initial provider who evaluated this patient. I have signed out the follow up of any pending tests (i.e. labs, radiological studies) to the resident.  I have communicated the patient’s plan of care and disposition with the resident.  The history, relevant review of systems, past medical and surgical history, medical decision making, and physical examination was documented by the scribe in my presence and I attest to the accuracy of the documentation.

## 2021-10-10 NOTE — ED STATDOCS - OBJECTIVE STATEMENT
62 y/o male with no pertinent PMHx presents to ED c/o foreign body in throat. Pt states he was eating soup with some meat, no bones in soup, feels like something got stuck in his throat. Pt having difficulty swallowing since 2PM. Hx of similar sx in past, was seen in ED and given glucagon with relief. NKDA. No daily medications. No PMD.   Pt is [place] speaking, pacific  used, ID#: [id number].

## 2021-10-10 NOTE — ED ADULT NURSE NOTE - NSIMPLEMENTINTERV_GEN_ALL_ED
Implemented All Universal Safety Interventions:  [place] to call system. Call bell, personal items and telephone within reach. Instruct patient to call for assistance. Room bathroom lighting operational. Non-slip footwear when patient is off stretcher. Physically safe environment: no spills, clutter or unnecessary equipment. Stretcher in lowest position, wheels locked, appropriate side rails in place.

## 2021-10-10 NOTE — ED STATDOCS - PROGRESS NOTE DETAILS
Janeth Moctezuma MD (PGY2) -  Pt po challenged, unable to tolerate, spoke to GI, will scope soon. Admitted to hospitalist. Princess MCGOVERN: Dr. Sutherland to take patient to endo for foot impaction.

## 2021-10-10 NOTE — BRIEF OPERATIVE NOTE - OPERATION/FINDINGS
EGD with meat bolus in mid esohpagus, removed with linn net. Esohpagus without significant stircuturing just very inflammed. Area biopsied.   Stomach and duodenun normal.

## 2021-10-10 NOTE — H&P ADULT - HISTORY OF PRESENT ILLNESS
61 year old male patient with prior history of impacted food bolus presented to the ED complaining of a one day history of dysphagia/ odynophagia associated with nausea and vomiting. Patient states he last ate soup before symptoms began. Endorses needing an Endoscopy during last impaction. Denies any recent weight loss, smoking history, shortness of breath, chest pain or abdominal pain.        Of note, patient had urgent EGD done with GI and is to be admitted overnight.

## 2021-10-10 NOTE — ED STATDOCS - CLINICAL SUMMARY MEDICAL DECISION MAKING FREE TEXT BOX
61M with foreign body, unable to tolerate PO, will order basic labs, IV glucagon, consider GI consult if necessary

## 2021-10-10 NOTE — H&P ADULT - NSHPPHYSICALEXAM_GEN_ALL_CORE
Vital Signs Last 24 Hrs  T(C): 36.7 (10 Oct 2021 21:00), Max: 37.3 (10 Oct 2021 16:39)  T(F): 98 (10 Oct 2021 21:00), Max: 99.2 (10 Oct 2021 16:39)  HR: 86 (10 Oct 2021 21:15) (85 - 104)  BP: 148/75 (10 Oct 2021 21:15) (136/71 - 185/94)  BP(mean): 120 (10 Oct 2021 16:39) (120 - 120)  RR: 15 (10 Oct 2021 21:15) (13 - 20)  SpO2: 98% (10 Oct 2021 21:15) (96% - 98%)

## 2021-10-10 NOTE — CONSULT NOTE ADULT - SUBJECTIVE AND OBJECTIVE BOX
Patient is a 61y old  Male who presents with a chief complaint of inability to tolerate secretions    61 year old man with DM, previous food impaction managed medically, here with food impaction.      phone used.   Patient states taht he was eating soup with meat earlier in the day. After a bite of meat he felt it get stuck in his chest. Tried to drink water to help but it came right back up. Has not been able to tolerate any of his secretions since then. Feels the senstion of choking. This happened to him once before and it was treated with medication. No chest pain currently just throat pain and the feeling of food stuck.     In general he doesn't have issues eating. No progressive  dysphagia this was acute. Doesn't recall ever having an endoscopy      PAST MEDICAL & SURGICAL HISTORY:  previous food impaction  diabetes  R hip surgery (unclear what exactly)        MEDICATIONS  (STANDING):   zofran      MEDICATIONS  (PRN):  zofran    Allergies    penicillins (Unknown)    Intolerances        SOCIAL HISTORY: no smoking, no drinking, no drugs    FAMILY HISTORY: no esophgeal, stomach or colon cancer      REVIEW OF SYSTEMS:    CONSTITUTIONAL: No weakness, fevers or chills  EYES/ENT: No visual changes;  No vertigo or throat pain   NECK: No pain or stiffness  RESPIRATORY: No cough, wheezing, hemoptysis; No shortness of breath  CARDIOVASCULAR: No chest pain or palpitations  GASTROINTESTINAL: per HPI  GENITOURINARY: No dysuria, frequency or hematuria  NEUROLOGICAL: No numbness or weakness  SKIN: No itching, burning, rashes, or lesions   PSYCH: Normal mood and affect  All other review of systems is negative unless indicated above.    Vital Signs Last 24 Hrs  T(C): 37.3 (10 Oct 2021 16:39), Max: 37.3 (10 Oct 2021 16:39)  T(F): 99.2 (10 Oct 2021 16:39), Max: 99.2 (10 Oct 2021 16:39)  HR: 104 (10 Oct 2021 16:39) (104 - 104)  BP: 185/94 (10 Oct 2021 16:39) (185/94 - 185/94)  BP(mean): 120 (10 Oct 2021 16:39) (120 - 120)  RR: 18 (10 Oct 2021 16:39) (18 - 18)  SpO2: 98% (10 Oct 2021 16:39) (98% - 98%)    PHYSICAL EXAM:    Constitutional: No acute distress, well-developed, non-toxic appearing  HEENT: masked, good phonation, not icteric  Neck: supple, no lymphadenopathy  Respiratory: clear to ascultation bilaterally, no wheezing  Cardiovascular: S1 and S2, regular rate and rhythm, no murmurs rubs or gallops  Gastrointestinal: soft, non-tender, non-distended, +bowel sounds, no rebound or guarding, no surgical scars, no drains  Extremities: No peripheral edema, no cyanosis or clubbing  Vascular: 2+ peripheral pulses, no venous stasis  Neurological: A/O x 3, no focal deficits, no asterixis  Psychiatric: Normal mood, normal affect  Skin: No rashes, not jaundiced    LABS:                        17.2   9.40  )-----------( 240      ( 10 Oct 2021 16:53 )             50.0     10-10    136  |  104  |  11  ----------------------------<  295<H>  4.3   |  26  |  0.95    Ca    9.6      10 Oct 2021 16:53    TPro  8.0  /  Alb  4.0  /  TBili  0.7  /  DBili  x   /  AST  11<L>  /  ALT  23  /  AlkPhos  91  10-10    PT/INR - ( 10 Oct 2021 17:35 )   PT: 11.2 sec;   INR: 0.96 ratio         PTT - ( 10 Oct 2021 17:35 )  PTT:30.7 sec  LIVER FUNCTIONS - ( 10 Oct 2021 16:53 )  Alb: 4.0 g/dL / Pro: 8.0 gm/dL / ALK PHOS: 91 U/L / ALT: 23 U/L / AST: 11 U/L / GGT: x             RADIOLOGY & ADDITIONAL STUDIES: covid negative

## 2021-10-10 NOTE — ED ADULT TRIAGE NOTE - CHIEF COMPLAINT QUOTE
Pt c/o something stuck in his throat. reported that he was eating beef stew at 2p when the incident took place.  No respiratory distress in triage

## 2021-10-10 NOTE — BRIEF OPERATIVE NOTE - COMMENTS
Clear liquids  BID PPI  Carafate 1 g four times daily  Clears now, soft diet tomorrow. Patient needs to chew very well while eating, no talking while eating, drink plenty of fluids, avoid tough to eat meat like chicken, steak, deli meat.   Could be discharged but we have had great difficulty reaching family. Therefore, since patient had general anesthesia, we are admitting him overnight for observation.

## 2021-10-10 NOTE — CONSULT NOTE ADULT - ASSESSMENT
61 year old man with DM, previous food impaction managed medically, here with food impaction.     Plan for EGD, urgent, as not tolerating secretions.

## 2021-10-10 NOTE — H&P ADULT - ASSESSMENT
61 year old male patient with impacted food bolus        -Admit to [facility]        # Dysphagia/ Impacted food bolus  -s/p EGD by GI  -currently tolerated oral intake  -start Protonic BID  -start Carafate 1g four times daily  -GI following    #Hx of DM2  -ISS    #DVT ppx  -scds    # Disposition  -likely discharge on 10/11/2021

## 2021-10-10 NOTE — ED ADULT NURSE NOTE - OBJECTIVE STATEMENT
Pt presents to the ED with c/o something stuck in his throat. reported that he was eating beef stew at 2p when the incident took place. pt repots similar incident in the past which medications worked. pt in no respiratory distress.

## 2021-10-11 ENCOUNTER — TRANSCRIPTION ENCOUNTER (OUTPATIENT)
Age: 61
End: 2021-10-11

## 2021-10-11 VITALS
TEMPERATURE: 97 F | RESPIRATION RATE: 18 BRPM | DIASTOLIC BLOOD PRESSURE: 65 MMHG | SYSTOLIC BLOOD PRESSURE: 132 MMHG | HEART RATE: 84 BPM | OXYGEN SATURATION: 96 %

## 2021-10-11 LAB
A1C WITH ESTIMATED AVERAGE GLUCOSE RESULT: 13.7 % — HIGH (ref 4–5.6)
ALBUMIN SERPL ELPH-MCNC: 3.1 G/DL — LOW (ref 3.3–5)
ALP SERPL-CCNC: 76 U/L — SIGNIFICANT CHANGE UP (ref 40–120)
ALT FLD-CCNC: 16 U/L — SIGNIFICANT CHANGE UP (ref 12–78)
ANION GAP SERPL CALC-SCNC: 9 MMOL/L — SIGNIFICANT CHANGE UP (ref 5–17)
AST SERPL-CCNC: 16 U/L — SIGNIFICANT CHANGE UP (ref 15–37)
BASOPHILS # BLD AUTO: 0.01 K/UL — SIGNIFICANT CHANGE UP (ref 0–0.2)
BASOPHILS NFR BLD AUTO: 0.1 % — SIGNIFICANT CHANGE UP (ref 0–2)
BILIRUB SERPL-MCNC: 0.8 MG/DL — SIGNIFICANT CHANGE UP (ref 0.2–1.2)
BUN SERPL-MCNC: 16 MG/DL — SIGNIFICANT CHANGE UP (ref 7–23)
CALCIUM SERPL-MCNC: 8.9 MG/DL — SIGNIFICANT CHANGE UP (ref 8.5–10.1)
CHLORIDE SERPL-SCNC: 105 MMOL/L — SIGNIFICANT CHANGE UP (ref 96–108)
CO2 SERPL-SCNC: 23 MMOL/L — SIGNIFICANT CHANGE UP (ref 22–31)
COVID-19 SPIKE DOMAIN AB INTERP: POSITIVE
COVID-19 SPIKE DOMAIN ANTIBODY RESULT: >250 U/ML — HIGH
CREAT SERPL-MCNC: 0.86 MG/DL — SIGNIFICANT CHANGE UP (ref 0.5–1.3)
EOSINOPHIL # BLD AUTO: 0 K/UL — SIGNIFICANT CHANGE UP (ref 0–0.5)
EOSINOPHIL NFR BLD AUTO: 0 % — SIGNIFICANT CHANGE UP (ref 0–6)
ESTIMATED AVERAGE GLUCOSE: 346 MG/DL — HIGH (ref 68–114)
GLUCOSE BLDC GLUCOMTR-MCNC: 226 MG/DL — HIGH (ref 70–99)
GLUCOSE BLDC GLUCOMTR-MCNC: 242 MG/DL — HIGH (ref 70–99)
GLUCOSE SERPL-MCNC: 252 MG/DL — HIGH (ref 70–99)
HCT VFR BLD CALC: 47.8 % — SIGNIFICANT CHANGE UP (ref 39–50)
HCV AB S/CO SERPL IA: 0.2 S/CO — SIGNIFICANT CHANGE UP (ref 0–0.99)
HCV AB SERPL-IMP: SIGNIFICANT CHANGE UP
HGB BLD-MCNC: 15.9 G/DL — SIGNIFICANT CHANGE UP (ref 13–17)
IMM GRANULOCYTES NFR BLD AUTO: 0.4 % — SIGNIFICANT CHANGE UP (ref 0–1.5)
INR BLD: 1.02 RATIO — SIGNIFICANT CHANGE UP (ref 0.88–1.16)
LYMPHOCYTES # BLD AUTO: 0.96 K/UL — LOW (ref 1–3.3)
LYMPHOCYTES # BLD AUTO: 8.6 % — LOW (ref 13–44)
MCHC RBC-ENTMCNC: 30.6 PG — SIGNIFICANT CHANGE UP (ref 27–34)
MCHC RBC-ENTMCNC: 33.3 GM/DL — SIGNIFICANT CHANGE UP (ref 32–36)
MCV RBC AUTO: 92.1 FL — SIGNIFICANT CHANGE UP (ref 80–100)
MONOCYTES # BLD AUTO: 0.37 K/UL — SIGNIFICANT CHANGE UP (ref 0–0.9)
MONOCYTES NFR BLD AUTO: 3.3 % — SIGNIFICANT CHANGE UP (ref 2–14)
NEUTROPHILS # BLD AUTO: 9.77 K/UL — HIGH (ref 1.8–7.4)
NEUTROPHILS NFR BLD AUTO: 87.6 % — HIGH (ref 43–77)
PLATELET # BLD AUTO: 210 K/UL — SIGNIFICANT CHANGE UP (ref 150–400)
POTASSIUM SERPL-MCNC: 4.4 MMOL/L — SIGNIFICANT CHANGE UP (ref 3.5–5.3)
POTASSIUM SERPL-SCNC: 4.4 MMOL/L — SIGNIFICANT CHANGE UP (ref 3.5–5.3)
PROT SERPL-MCNC: 6.7 GM/DL — SIGNIFICANT CHANGE UP (ref 6–8.3)
PROTHROM AB SERPL-ACNC: 11.9 SEC — SIGNIFICANT CHANGE UP (ref 10.6–13.6)
RBC # BLD: 5.19 M/UL — SIGNIFICANT CHANGE UP (ref 4.2–5.8)
RBC # FLD: 12.1 % — SIGNIFICANT CHANGE UP (ref 10.3–14.5)
SARS-COV-2 IGG+IGM SERPL QL IA: >250 U/ML — HIGH
SARS-COV-2 IGG+IGM SERPL QL IA: POSITIVE
SODIUM SERPL-SCNC: 137 MMOL/L — SIGNIFICANT CHANGE UP (ref 135–145)
WBC # BLD: 11.16 K/UL — HIGH (ref 3.8–10.5)
WBC # FLD AUTO: 11.16 K/UL — HIGH (ref 3.8–10.5)

## 2021-10-11 PROCEDURE — 99232 SBSQ HOSP IP/OBS MODERATE 35: CPT

## 2021-10-11 RX ORDER — SUCRALFATE 1 G
1 TABLET ORAL
Qty: 120 | Refills: 0
Start: 2021-10-11 | End: 2021-11-09

## 2021-10-11 RX ORDER — BNT162B2 0.23 MG/2.25ML
0.3 INJECTION, SUSPENSION INTRAMUSCULAR
Qty: 0 | Refills: 0 | DISCHARGE

## 2021-10-11 RX ORDER — PANTOPRAZOLE SODIUM 20 MG/1
1 TABLET, DELAYED RELEASE ORAL
Qty: 60 | Refills: 0
Start: 2021-10-11 | End: 2021-11-09

## 2021-10-11 RX ORDER — METFORMIN HYDROCHLORIDE 850 MG/1
1 TABLET ORAL
Qty: 60 | Refills: 0
Start: 2021-10-11 | End: 2021-11-09

## 2021-10-11 RX ADMIN — PANTOPRAZOLE SODIUM 40 MILLIGRAM(S): 20 TABLET, DELAYED RELEASE ORAL at 10:29

## 2021-10-11 RX ADMIN — Medication 2: at 08:33

## 2021-10-11 RX ADMIN — Medication 2: at 12:02

## 2021-10-11 RX ADMIN — Medication 1000 UNIT(S): at 10:29

## 2021-10-11 RX ADMIN — Medication 1 TABLET(S): at 10:29

## 2021-10-11 RX ADMIN — Medication 1 GRAM(S): at 14:23

## 2021-10-11 RX ADMIN — Medication 1 GRAM(S): at 05:21

## 2021-10-11 RX ADMIN — Medication 650 MILLIGRAM(S): at 08:02

## 2021-10-11 RX ADMIN — SODIUM CHLORIDE 75 MILLILITER(S): 9 INJECTION, SOLUTION INTRAVENOUS at 05:21

## 2021-10-11 RX ADMIN — Medication 650 MILLIGRAM(S): at 10:29

## 2021-10-11 NOTE — DISCHARGE NOTE NURSING/CASE MANAGEMENT/SOCIAL WORK - PATIENT PORTAL LINK FT
Vitreous opacities/debris are not visually significant. You can access the FollowMyHealth Patient Portal offered by [facility] by registering at the following website: [email or web address]. By joining [employer]’s FollowMyHealth portal, you will also be able to view your health information using other applications (apps) compatible with our system.

## 2021-10-11 NOTE — DISCHARGE NOTE PROVIDER - HOSPITAL COURSE
61 year old male patient with prior history of impacted food bolus presented to the ED complaining of a one day history of dysphagia/ odynophagia associated with nausea and vomiting. Patient states he last ate soup before symptoms began. Endorses needing an Endoscopy during last impaction. Denies any recent weight loss, smoking history, shortness of breath, chest pain or abdominal pain.  Seen by GI, Upper endoscopy performed and impacted food ( meat) found in  mid esophagus. removed with  Lopez net.  Esophagus without significant strictures but noted to be very inflamed. area was biopsied. Stomach and duodenum noted to be normal.  PT not fanny po foods without issue. no N/v, CP or SOB. Of note pt has DM@, with A1C 12.0, not on any anti-hyperglycemics, Pt does not have PCP, [facility] information provided to pt using pacific interpreters #[id number]    Vital Signs Last 24 Hrs  T(C): 36.3 (10-11-21 @ 07:53), Max: 37.3 (10-10-21 @ 16:39)  T(F): 97.4 (10-11-21 @ 07:53), Max: 99.2 (10-10-21 @ 16:39)  HR: 84 (10-11-21 @ 07:53) (84 - 104)  BP: 132/65 (10-11-21 @ 07:53) (131/69 - 185/94)  BP(mean): 120 (10-10-21 @ 16:39) (120 - 120)  RR: 18 (10-11-21 @ 07:53) (13 - 20)  SpO2: 96% (10-11-21 @ 07:53) (96% - 98%)    10/11/2021:  seen at bedside, denies any pain, N/V    PHYSICAL EXAM:    GENERAL: Comfortable, no acute distress   HEAD:  Normocephalic, atraumatic  EYES: EOMI, PERRLA  HEENT: Moist mucous membranes  NECK: Supple, No JVD  NERVOUS SYSTEM:  Alert & Oriented X3, Motor Strength 5/5 B/L upper and lower extremities  CHEST/LUNG: Clear to auscultation bilaterally  HEART: Regular rate and rhythm  ABDOMEN: Soft, non tender, Nondistended, Bowel sounds present  GENITOURINARY: Voiding, no palpable bladder  EXTREMITIES:   No clubbing, cyanosis, or edema  MUSCULOSKELETAL- No muscle tenderness, no joint tenderness  SKIN-no rash                          15.9   11.16 )-----------( 210      ( 11 Oct 2021 07:53 )             47.8       10-11    137  |  105  |  16  ----------------------------<  252<H>  4.4   |  23  |  0.86    Ca    8.9      11 Oct 2021 07:53    TPro  6.7  /  Alb  3.1<L>  /  TBili  0.8  /  DBili  x   /  AST  16  /  ALT  16  /  AlkPhos  76  10-11      PT/INR - ( 11 Oct 2021 07:53 )   PT: 11.9 sec;   INR: 1.02 ratio         PTT - ( 10 Oct 2021 17:35 )  PTT:30.7 sec      Final diagnoses:  #food impaction  #DM2    time for discharge: 45 mins  d/w: pt

## 2021-10-11 NOTE — DISCHARGE NOTE PROVIDER - CARE PROVIDER_API CALL
Adán Sutherland)  Gastroenterology; Internal Medicine  [address] [place] [address]  Phone: [phone]  Fax: [phone]  Follow Up Time: 2 weeks    [facility],   Phone: [phone]  Fax: (   )    -  Follow Up Time: 1-3 days

## 2021-10-11 NOTE — DISCHARGE NOTE PROVIDER - NSDCCPCAREPLAN_GEN_ALL_CORE_FT
PRINCIPAL DISCHARGE DIAGNOSIS  Diagnosis: Foreign body in throat  Assessment and Plan of Treatment: soft diet Patient needs to chew very well while eating, no talking while eating, drink plenty of fluids, avoid tough to eat meat like chicken, steak, deli meat.

## 2021-10-11 NOTE — DISCHARGE NOTE PROVIDER - PROVIDER TOKENS
PROVIDER:[TOKEN:[[id number]],FOLLOWUP:[2 weeks]],FREE:[LAST:[[facility]],PHONE:[phone]],FAX:[(   )    -],FOLLOWUP:[1-3 days]]

## 2021-10-11 NOTE — DISCHARGE NOTE PROVIDER - NSDCMRMEDTOKEN_GEN_ALL_CORE_FT
metFORMIN 1000 mg oral tablet: 1 tab(s) orally 2 times a day   Multiple Vitamins oral tablet: 1 tab(s) orally once a day  pantoprazole 40 mg oral delayed release tablet: 1 tab(s) orally 2 times a day  sucralfate 1 g oral tablet: 1 gram(s) orally 4 times a day   please give liquid  Vitamin D3 25 mcg (1000 intl units) oral tablet: 1 tab(s) orally once a day

## 2021-10-11 NOTE — DISCHARGE NOTE PROVIDER - NSDCFUADDINST_GEN_ALL_CORE_FT
don't speak while eating, chew your food thoroughly and avoid hard to chew meats: like steak, chicken and deli meats

## 2021-10-14 LAB — SURGICAL PATHOLOGY STUDY: SIGNIFICANT CHANGE UP

## 2022-01-31 NOTE — PHARMACOTHERAPY INTERVENTION NOTE - INTERVENTION CATEGORIES
Med Reconciliation PAST SURGICAL HISTORY:  (induced) termination of pregnancy with other complications

## 2022-05-17 ENCOUNTER — APPOINTMENT (OUTPATIENT)
Dept: GASTROENTEROLOGY | Facility: HOSPITAL | Age: 62
End: 2022-05-17

## 2023-04-06 NOTE — ED ADULT NURSE NOTE - CAS TRG GEN SKIN COLOR
Final Anesthesia Post-op Assessment    Patient: Jose Mascorro  Procedure(s) Performed: CYSTOSCOPY TRANSURETHRAL PHOTOSELECTIVE VAPORIZATION OF PROSTATE  Anesthesia type: General    Vitals Value Taken Time   Temp 36.4 04/06/23 1242   Pulse 64 04/06/23 1241   Resp 20 04/06/23 1241   SpO2 95 % 04/06/23 1241   BP 99/73 04/06/23 1238   Vitals shown include unvalidated device data.      Patient Location: PACU Phase 1  Post-op Vital Signs:stable  Level of Consciousness: awake and alert  Respiratory Status: spontaneous ventilation  Cardiovascular stable  Hydration: euvolemic  Pain Management: adequately controlled  Handoff: Handoff to receiving clinician was performed and questions were answered  Vomiting: none  Nausea: None  Airway Patency:patent  Post-op Assessment: no complications and patient tolerated procedure well with no complications      No notable events documented.   
Normal for race

## 2023-11-03 NOTE — ED ADULT TRIAGE NOTE - HEIGHT IN INCHES
Department of Anesthesiology  Postprocedure Note    Patient: Tg Herrmann  MRN: [id number]  YOB: 1957  Date of evaluation: 11/3/2023      Procedure Summary     Date: 11/03/23 Room / Location: [address] [place] [address] NON-INVASIVE CARDIOLOGY    Anesthesia Start: 3020 Anesthesia Stop: 4233    Procedure: LUIS W/ POSSIBLE CARDIOVERSION (PRN CONTRAST/BUBBLE/3D) Diagnosis: Atrial fibrillation, persistent ([address])    Scheduled Providers: David Pena MD; Mitchel Lan DO Responsible Provider: Mitchel Lan DO    Anesthesia Type: MAC ASA Status: 3          Anesthesia Type: MAC    Shannen Phase I: Shannen Score: 10    Shannen Phase II:        Anesthesia Post Evaluation    Patient location during evaluation: PACU  Level of consciousness: awake  Airway patency: patent  Nausea & Vomiting: no nausea  Complications: no  Cardiovascular status: hemodynamically stable  Respiratory status: acceptable  Hydration status: stable  Multimodal analgesia pain management approach  Pain management: adequate
64

## 2025-07-20 ENCOUNTER — EMERGENCY (EMERGENCY)
Facility: HOSPITAL | Age: 65
LOS: 0 days | Discharge: HOME | End: 2025-07-21
Attending: FAMILY MEDICINE
Payer: COMMERCIAL

## 2025-07-20 VITALS
SYSTOLIC BLOOD PRESSURE: 170 MMHG | DIASTOLIC BLOOD PRESSURE: 79 MMHG | TEMPERATURE: 99 F | OXYGEN SATURATION: 97 % | HEART RATE: 90 BPM | RESPIRATION RATE: 18 BRPM

## 2025-07-20 DIAGNOSIS — R07.9 CHEST PAIN, UNSPECIFIED: ICD-10-CM

## 2025-07-20 DIAGNOSIS — Z88.0 ALLERGY STATUS TO PENICILLIN: ICD-10-CM

## 2025-07-20 DIAGNOSIS — E11.9 TYPE 2 DIABETES MELLITUS WITHOUT COMPLICATIONS: ICD-10-CM
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
## 2025-07-20 DIAGNOSIS — T18.128A FOOD IN ESOPHAGUS CAUSING OTHER INJURY, INITIAL ENCOUNTER: ICD-10-CM
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
## 2025-07-20 DIAGNOSIS — R13.10 DYSPHAGIA, UNSPECIFIED: ICD-10-CM

## 2025-07-20 DIAGNOSIS — Z79.84 LONG TERM (CURRENT) USE OF ORAL HYPOGLYCEMIC DRUGS: ICD-10-CM

## 2025-07-20 LAB
ALBUMIN SERPL ELPH-MCNC: 3.5 G/DL — SIGNIFICANT CHANGE UP (ref 3.3–5)
ALP SERPL-CCNC: 92 U/L — SIGNIFICANT CHANGE UP (ref 40–120)
ALT FLD-CCNC: 14 U/L — SIGNIFICANT CHANGE UP (ref 12–78)
ANION GAP SERPL CALC-SCNC: 10 MMOL/L — SIGNIFICANT CHANGE UP (ref 5–17)
AST SERPL-CCNC: 14 U/L — LOW (ref 15–37)
BASOPHILS # BLD AUTO: 0.01 K/UL — SIGNIFICANT CHANGE UP (ref 0–0.2)
BASOPHILS NFR BLD AUTO: 0.1 % — SIGNIFICANT CHANGE UP (ref 0–2)
BILIRUB SERPL-MCNC: 0.6 MG/DL — SIGNIFICANT CHANGE UP (ref 0.2–1.2)
BUN SERPL-MCNC: 12 MG/DL — SIGNIFICANT CHANGE UP (ref 7–23)
CALCIUM SERPL-MCNC: 9.5 MG/DL — SIGNIFICANT CHANGE UP (ref 8.5–10.1)
CHLORIDE SERPL-SCNC: 99 MMOL/L — SIGNIFICANT CHANGE UP (ref 96–108)
CO2 SERPL-SCNC: 23 MMOL/L — SIGNIFICANT CHANGE UP (ref 22–31)
CREAT SERPL-MCNC: 1.28 MG/DL — SIGNIFICANT CHANGE UP (ref 0.5–1.3)
EGFR: 61 ML/MIN/1.73M2 — SIGNIFICANT CHANGE UP
EGFR: 61 ML/MIN/1.73M2 — SIGNIFICANT CHANGE UP
EOSINOPHIL # BLD AUTO: 0.01 K/UL — SIGNIFICANT CHANGE UP (ref 0–0.5)
EOSINOPHIL NFR BLD AUTO: 0.1 % — SIGNIFICANT CHANGE UP (ref 0–6)
GLUCOSE SERPL-MCNC: 346 MG/DL — HIGH (ref 70–99)
HCT VFR BLD CALC: 46.2 % — SIGNIFICANT CHANGE UP (ref 39–50)
HGB BLD-MCNC: 16.3 G/DL — SIGNIFICANT CHANGE UP (ref 13–17)
IMM GRANULOCYTES # BLD AUTO: 0.02 K/UL — SIGNIFICANT CHANGE UP (ref 0–0.07)
IMM GRANULOCYTES NFR BLD AUTO: 0.3 % — SIGNIFICANT CHANGE UP (ref 0–0.9)
LYMPHOCYTES # BLD AUTO: 0.88 K/UL — LOW (ref 1–3.3)
LYMPHOCYTES NFR BLD AUTO: 12.1 % — LOW (ref 13–44)
MCHC RBC-ENTMCNC: 31.4 PG — SIGNIFICANT CHANGE UP (ref 27–34)
MCHC RBC-ENTMCNC: 35.3 G/DL — SIGNIFICANT CHANGE UP (ref 32–36)
MCV RBC AUTO: 89 FL — SIGNIFICANT CHANGE UP (ref 80–100)
MONOCYTES # BLD AUTO: 0.48 K/UL — SIGNIFICANT CHANGE UP (ref 0–0.9)
MONOCYTES NFR BLD AUTO: 6.6 % — SIGNIFICANT CHANGE UP (ref 2–14)
NEUTROPHILS # BLD AUTO: 5.85 K/UL — SIGNIFICANT CHANGE UP (ref 1.8–7.4)
NEUTROPHILS NFR BLD AUTO: 80.8 % — HIGH (ref 43–77)
NRBC # BLD AUTO: 0 K/UL — SIGNIFICANT CHANGE UP (ref 0–0)
NRBC # FLD: 0 K/UL — SIGNIFICANT CHANGE UP (ref 0–0)
NRBC BLD AUTO-RTO: 0 /100 WBCS — SIGNIFICANT CHANGE UP (ref 0–0)
PLATELET # BLD AUTO: 202 K/UL — SIGNIFICANT CHANGE UP (ref 150–400)
PMV BLD: 9.9 FL — SIGNIFICANT CHANGE UP (ref 7–13)
POTASSIUM SERPL-MCNC: 4.3 MMOL/L — SIGNIFICANT CHANGE UP (ref 3.5–5.3)
POTASSIUM SERPL-SCNC: 4.3 MMOL/L — SIGNIFICANT CHANGE UP (ref 3.5–5.3)
PROT SERPL-MCNC: 7.1 GM/DL — SIGNIFICANT CHANGE UP (ref 6–8.3)
RBC # BLD: 5.19 M/UL — SIGNIFICANT CHANGE UP (ref 4.2–5.8)
RBC # FLD: 12.2 % — SIGNIFICANT CHANGE UP (ref 10.3–14.5)
SODIUM SERPL-SCNC: 132 MMOL/L — LOW (ref 135–145)
TROPONIN I, HIGH SENSITIVITY RESULT: 7.77 NG/L — SIGNIFICANT CHANGE UP
WBC # BLD: 7.25 K/UL — SIGNIFICANT CHANGE UP (ref 3.8–10.5)
WBC # FLD AUTO: 7.25 K/UL — SIGNIFICANT CHANGE UP (ref 3.8–10.5)

## 2025-07-20 PROCEDURE — 93010 ELECTROCARDIOGRAM REPORT: CPT

## 2025-07-20 PROCEDURE — 99285 EMERGENCY DEPT VISIT HI MDM: CPT | Mod: 25

## 2025-07-20 PROCEDURE — 36415 COLL VENOUS BLD VENIPUNCTURE: CPT

## 2025-07-20 PROCEDURE — 99285 EMERGENCY DEPT VISIT HI MDM: CPT

## 2025-07-20 PROCEDURE — 93005 ELECTROCARDIOGRAM TRACING: CPT

## 2025-07-20 PROCEDURE — 82962 GLUCOSE BLOOD TEST: CPT

## 2025-07-20 PROCEDURE — 71045 X-RAY EXAM CHEST 1 VIEW: CPT

## 2025-07-20 PROCEDURE — 71045 X-RAY EXAM CHEST 1 VIEW: CPT | Mod: 26

## 2025-07-20 PROCEDURE — 80053 COMPREHEN METABOLIC PANEL: CPT

## 2025-07-20 PROCEDURE — 96374 THER/PROPH/DIAG INJ IV PUSH: CPT

## 2025-07-20 PROCEDURE — 85025 COMPLETE CBC W/AUTO DIFF WBC: CPT

## 2025-07-20 PROCEDURE — 84484 ASSAY OF TROPONIN QUANT: CPT

## 2025-07-20 RX ORDER — GLUCAGON 3 MG/1
1 POWDER NASAL ONCE
Refills: 0 | Status: COMPLETED | OUTPATIENT
Start: 2025-07-20 | End: 2025-07-20

## 2025-07-20 RX ORDER — ONDANSETRON HCL/PF 4 MG/2 ML
4 VIAL (ML) INJECTION ONCE
Refills: 0 | Status: COMPLETED | OUTPATIENT
Start: 2025-07-20 | End: 2025-07-20

## 2025-07-20 RX ADMIN — Medication 4 MILLIGRAM(S): at 21:31

## 2025-07-20 NOTE — ED PROVIDER NOTE - OBJECTIVE STATEMENT
64 y/o male with a PMHx of DM on oral hypoglycemic presents to the ED c/o food lodged in esophagus. Pt states he has known difficulty with food getting stuck in his esophagus. Pt states he has never seen GI. Pt states that tonight he was eating and he felt a piece of tripe get caught in esophagus. Pt states since he has been in the ED the food passed. Pt states he also had an episode of chest pain that lasted 30 mins. Pt states that the pain subsided and that he feels much better now.

## 2025-07-20 NOTE — ED PROVIDER NOTE - NSFOLLOWUPINSTRUCTIONS_ED_ALL_ED_FT
Chew your food thoroughly. Avoid drinking alcohol with your meals. Follow up with the [facility] and Gi doctor to get an upper endoscopy. Maintain a low carbohydrate diet. Return to Er if worse.

## 2025-07-20 NOTE — ED STATDOCS - PROGRESS NOTE DETAILS
Scribe Rakesh Gtz for Doctor Stout:  64 y/o male presents to ED with chest pain, states whenever he eats food gets stuck. Last time he ate was 6:30. States he has vomited multiple times since 6:30.   I, Rakesh Gtz, attest that this documentation has been prepared under the direction and in the presence of Doctor Girish.

## 2025-07-20 NOTE — ED ADULT TRIAGE NOTE - CHIEF COMPLAINT QUOTE
Pt presents to ED in wheelchair, ambulatory at baseline, c/o chest pain. Pt reports he was eating and "food got stuck and had the pain". Pt vomiting since 6:30pm and actively vomiting in triage. PMHx Diabetes. Pt taken for STAT EKG.

## 2025-07-20 NOTE — ED PROVIDER NOTE - CARE PLAN
1 Principal Discharge DX:	Impacted foreign body in esophagus  Secondary Diagnosis:	Dysphagia  Secondary Diagnosis:	Chest pain

## 2025-07-20 NOTE — ED PROVIDER NOTE - CLINICAL SUMMARY MEDICAL DECISION MAKING FREE TEXT BOX
Pt with hx of DM and hx of dysphagia, never worked up here with impacted food bolus that subsided after meds and chest pain. Plan for labs, IV fluids, EKG and discharge.

## 2025-07-21 VITALS
RESPIRATION RATE: 16 BRPM | HEART RATE: 91 BPM | DIASTOLIC BLOOD PRESSURE: 86 MMHG | SYSTOLIC BLOOD PRESSURE: 168 MMHG | OXYGEN SATURATION: 98 % | TEMPERATURE: 98 F

## 2025-07-21 LAB — GLUCOSE BLDC GLUCOMTR-MCNC: 305 MG/DL — HIGH (ref 70–99)

## 2025-07-21 RX ADMIN — Medication 1000 MILLILITER(S): at 00:00

## 2025-07-21 NOTE — ED ADULT NURSE NOTE - NSFALLUNIVINTERV_ED_ALL_ED
Bed/Stretcher in lowest position, wheels locked, appropriate side rails in place/Call bell, personal items and telephone in reach/Instruct patient to call for assistance before getting out of bed/chair/stretcher/Non-slip footwear applied when patient is off stretcher/[place] to call system/Physically safe environment - no spills, clutter or unnecessary equipment/Purposeful proactive rounding/Room/bathroom lighting operational, light cord in reach

## 2025-07-21 NOTE — ED ADULT NURSE NOTE - OBJECTIVE STATEMENT
Pt ambulatory c/o CP and abdominal pain r/t food impaction. Per pt, he was eating steak around 4:00 pm and reports the steak got stuck. Endorsing vomiting. Pt was unable to keep food down. Pt reports at 19:30, food impaction resolved but pt is still having nausea and abdominal pain. Denies SOB. AO x 4. No meds PTA.

## 2025-08-06 ENCOUNTER — EMERGENCY (EMERGENCY)
Facility: HOSPITAL | Age: 65
LOS: 0 days | Discharge: ANOTHER HOSPITAL | End: 2025-08-06
Attending: STUDENT IN AN ORGANIZED HEALTH CARE EDUCATION/TRAINING PROGRAM
Payer: COMMERCIAL

## 2025-08-06 ENCOUNTER — EMERGENCY (EMERGENCY)
Facility: HOSPITAL | Age: 65
LOS: 1 days | End: 2025-08-06
Attending: EMERGENCY MEDICINE | Admitting: EMERGENCY MEDICINE
Payer: MEDICARE

## 2025-08-06 VITALS
OXYGEN SATURATION: 99 % | HEART RATE: 75 BPM | RESPIRATION RATE: 19 BRPM | SYSTOLIC BLOOD PRESSURE: 185 MMHG | DIASTOLIC BLOOD PRESSURE: 94 MMHG | TEMPERATURE: 98 F

## 2025-08-06 VITALS
DIASTOLIC BLOOD PRESSURE: 93 MMHG | SYSTOLIC BLOOD PRESSURE: 159 MMHG | RESPIRATION RATE: 18 BRPM | HEART RATE: 61 BPM | TEMPERATURE: 98 F | OXYGEN SATURATION: 98 %

## 2025-08-06 VITALS
OXYGEN SATURATION: 96 % | SYSTOLIC BLOOD PRESSURE: 169 MMHG | HEART RATE: 79 BPM | RESPIRATION RATE: 18 BRPM | DIASTOLIC BLOOD PRESSURE: 86 MMHG | TEMPERATURE: 98 F

## 2025-08-06 DIAGNOSIS — Z88.0 ALLERGY STATUS TO PENICILLIN: ICD-10-CM

## 2025-08-06 DIAGNOSIS — R51.9 HEADACHE, UNSPECIFIED: ICD-10-CM

## 2025-08-06 DIAGNOSIS — H40.20X0 UNSPECIFIED PRIMARY ANGLE-CLOSURE GLAUCOMA, STAGE UNSPECIFIED: ICD-10-CM

## 2025-08-06 DIAGNOSIS — E11.9 TYPE 2 DIABETES MELLITUS WITHOUT COMPLICATIONS: ICD-10-CM

## 2025-08-06 DIAGNOSIS — H53.8 OTHER VISUAL DISTURBANCES: ICD-10-CM

## 2025-08-06 DIAGNOSIS — I10 ESSENTIAL (PRIMARY) HYPERTENSION: ICD-10-CM

## 2025-08-06 LAB
ALBUMIN SERPL ELPH-MCNC: 3.2 G/DL — LOW (ref 3.3–5)
ALP SERPL-CCNC: 83 U/L — SIGNIFICANT CHANGE UP (ref 40–120)
ALT FLD-CCNC: 16 U/L — SIGNIFICANT CHANGE UP (ref 12–78)
ANION GAP SERPL CALC-SCNC: 4 MMOL/L — LOW (ref 5–17)
APTT BLD: 29.9 SEC — SIGNIFICANT CHANGE UP (ref 26.1–36.8)
AST SERPL-CCNC: 8 U/L — LOW (ref 15–37)
BASE EXCESS BLDV CALC-SCNC: 0.1 MMOL/L — SIGNIFICANT CHANGE UP (ref -2–3)
BASOPHILS # BLD AUTO: 0.02 K/UL — SIGNIFICANT CHANGE UP (ref 0–0.2)
BASOPHILS NFR BLD AUTO: 0.3 % — SIGNIFICANT CHANGE UP (ref 0–2)
BILIRUB SERPL-MCNC: 0.5 MG/DL — SIGNIFICANT CHANGE UP (ref 0.2–1.2)
BLD GP AB SCN SERPL QL: SIGNIFICANT CHANGE UP
BUN SERPL-MCNC: 14 MG/DL — SIGNIFICANT CHANGE UP (ref 7–23)
CALCIUM SERPL-MCNC: 9 MG/DL — SIGNIFICANT CHANGE UP (ref 8.5–10.1)
CHLORIDE SERPL-SCNC: 104 MMOL/L — SIGNIFICANT CHANGE UP (ref 96–108)
CO2 SERPL-SCNC: 28 MMOL/L — SIGNIFICANT CHANGE UP (ref 22–31)
CREAT SERPL-MCNC: 1.04 MG/DL — SIGNIFICANT CHANGE UP (ref 0.5–1.3)
EGFR: 79 ML/MIN/1.73M2 — SIGNIFICANT CHANGE UP
EGFR: 79 ML/MIN/1.73M2 — SIGNIFICANT CHANGE UP
EOSINOPHIL # BLD AUTO: 0.04 K/UL — SIGNIFICANT CHANGE UP (ref 0–0.5)
EOSINOPHIL NFR BLD AUTO: 0.7 % — SIGNIFICANT CHANGE UP (ref 0–6)
GAS PNL BLDV: SIGNIFICANT CHANGE UP
GLUCOSE SERPL-MCNC: 272 MG/DL — HIGH (ref 70–99)
HCO3 BLDV-SCNC: 28 MMOL/L — SIGNIFICANT CHANGE UP (ref 22–29)
HCT VFR BLD CALC: 46.8 % — SIGNIFICANT CHANGE UP (ref 39–50)
HGB BLD-MCNC: 16.3 G/DL — SIGNIFICANT CHANGE UP (ref 13–17)
IMM GRANULOCYTES # BLD AUTO: 0.01 K/UL — SIGNIFICANT CHANGE UP (ref 0–0.07)
IMM GRANULOCYTES NFR BLD AUTO: 0.2 % — SIGNIFICANT CHANGE UP (ref 0–0.9)
INR BLD: 0.85 RATIO — SIGNIFICANT CHANGE UP (ref 0.85–1.16)
LYMPHOCYTES # BLD AUTO: 1.56 K/UL — SIGNIFICANT CHANGE UP (ref 1–3.3)
LYMPHOCYTES NFR BLD AUTO: 25.8 % — SIGNIFICANT CHANGE UP (ref 13–44)
MCHC RBC-ENTMCNC: 31.3 PG — SIGNIFICANT CHANGE UP (ref 27–34)
MCHC RBC-ENTMCNC: 34.8 G/DL — SIGNIFICANT CHANGE UP (ref 32–36)
MCV RBC AUTO: 89.8 FL — SIGNIFICANT CHANGE UP (ref 80–100)
MONOCYTES # BLD AUTO: 0.41 K/UL — SIGNIFICANT CHANGE UP (ref 0–0.9)
MONOCYTES NFR BLD AUTO: 6.8 % — SIGNIFICANT CHANGE UP (ref 2–14)
NEUTROPHILS # BLD AUTO: 4 K/UL — SIGNIFICANT CHANGE UP (ref 1.8–7.4)
NEUTROPHILS NFR BLD AUTO: 66.2 % — SIGNIFICANT CHANGE UP (ref 43–77)
NRBC # BLD AUTO: 0 K/UL — SIGNIFICANT CHANGE UP (ref 0–0)
NRBC # FLD: 0 K/UL — SIGNIFICANT CHANGE UP (ref 0–0)
NRBC BLD AUTO-RTO: 0 /100 WBCS — SIGNIFICANT CHANGE UP (ref 0–0)
PCO2 BLDV: 58 MMHG — HIGH (ref 42–55)
PH BLDV: 7.29 — LOW (ref 7.32–7.43)
PLATELET # BLD AUTO: 204 K/UL — SIGNIFICANT CHANGE UP (ref 150–400)
PMV BLD: 9.8 FL — SIGNIFICANT CHANGE UP (ref 7–13)
PO2 BLDV: 38 MMHG — SIGNIFICANT CHANGE UP (ref 25–45)
POTASSIUM SERPL-MCNC: 4.3 MMOL/L — SIGNIFICANT CHANGE UP (ref 3.5–5.3)
POTASSIUM SERPL-SCNC: 4.3 MMOL/L — SIGNIFICANT CHANGE UP (ref 3.5–5.3)
PROT SERPL-MCNC: 6.6 GM/DL — SIGNIFICANT CHANGE UP (ref 6–8.3)
PROTHROM AB SERPL-ACNC: 10 SEC — SIGNIFICANT CHANGE UP (ref 9.9–13.4)
RBC # BLD: 5.21 M/UL — SIGNIFICANT CHANGE UP (ref 4.2–5.8)
RBC # FLD: 12.1 % — SIGNIFICANT CHANGE UP (ref 10.3–14.5)
SAO2 % BLDV: 66 % — LOW (ref 67–88)
SODIUM SERPL-SCNC: 136 MMOL/L — SIGNIFICANT CHANGE UP (ref 135–145)
WBC # BLD: 6.04 K/UL — SIGNIFICANT CHANGE UP (ref 3.8–10.5)
WBC # FLD AUTO: 6.04 K/UL — SIGNIFICANT CHANGE UP (ref 3.8–10.5)

## 2025-08-06 PROCEDURE — 85730 THROMBOPLASTIN TIME PARTIAL: CPT

## 2025-08-06 PROCEDURE — 93010 ELECTROCARDIOGRAM REPORT: CPT

## 2025-08-06 PROCEDURE — 70496 CT ANGIOGRAPHY HEAD: CPT

## 2025-08-06 PROCEDURE — 82803 BLOOD GASES ANY COMBINATION: CPT

## 2025-08-06 PROCEDURE — 70496 CT ANGIOGRAPHY HEAD: CPT | Mod: 26

## 2025-08-06 PROCEDURE — 86900 BLOOD TYPING SEROLOGIC ABO: CPT

## 2025-08-06 PROCEDURE — 36415 COLL VENOUS BLD VENIPUNCTURE: CPT

## 2025-08-06 PROCEDURE — 70498 CT ANGIOGRAPHY NECK: CPT | Mod: 26

## 2025-08-06 PROCEDURE — 96374 THER/PROPH/DIAG INJ IV PUSH: CPT | Mod: XU

## 2025-08-06 PROCEDURE — 86901 BLOOD TYPING SEROLOGIC RH(D): CPT

## 2025-08-06 PROCEDURE — 93005 ELECTROCARDIOGRAM TRACING: CPT

## 2025-08-06 PROCEDURE — 99285 EMERGENCY DEPT VISIT HI MDM: CPT

## 2025-08-06 PROCEDURE — 86850 RBC ANTIBODY SCREEN: CPT

## 2025-08-06 PROCEDURE — 85025 COMPLETE CBC W/AUTO DIFF WBC: CPT

## 2025-08-06 PROCEDURE — 80053 COMPREHEN METABOLIC PANEL: CPT

## 2025-08-06 PROCEDURE — 99284 EMERGENCY DEPT VISIT MOD MDM: CPT

## 2025-08-06 PROCEDURE — 70498 CT ANGIOGRAPHY NECK: CPT

## 2025-08-06 PROCEDURE — 85610 PROTHROMBIN TIME: CPT

## 2025-08-06 PROCEDURE — 99285 EMERGENCY DEPT VISIT HI MDM: CPT | Mod: 25

## 2025-08-06 RX ORDER — FLUORESCEIN SODIUM 0.6 MG
1 STRIP OPHTHALMIC (EYE) ONCE
Refills: 0 | Status: COMPLETED | OUTPATIENT
Start: 2025-08-06 | End: 2025-08-06

## 2025-08-06 RX ORDER — ACETAZOLAMIDE 250 MG/1
500 TABLET ORAL ONCE
Refills: 0 | Status: COMPLETED | OUTPATIENT
Start: 2025-08-06 | End: 2025-08-06

## 2025-08-06 RX ORDER — BRIMONIDINE TARTRATE 1.5 MG/ML
1 SOLUTION/ DROPS OPHTHALMIC DAILY
Refills: 0 | Status: DISCONTINUED | OUTPATIENT
Start: 2025-08-06 | End: 2025-08-06

## 2025-08-06 RX ORDER — ACETAZOLAMIDE 250 MG/1
500 TABLET ORAL ONCE
Refills: 0 | Status: DISCONTINUED | OUTPATIENT
Start: 2025-08-06 | End: 2025-08-06

## 2025-08-06 RX ORDER — LATANOPROST PF 0.05 MG/ML
1 SOLUTION/ DROPS OPHTHALMIC DAILY
Refills: 0 | Status: DISCONTINUED | OUTPATIENT
Start: 2025-08-06 | End: 2025-08-06

## 2025-08-06 RX ORDER — DORZOLAMIDE HYDROCHLORIDE AND TIMOLOL MALEATE 20; 5 MG/ML; MG/ML
1 SOLUTION/ DROPS OPHTHALMIC EVERY 12 HOURS
Refills: 0 | Status: DISCONTINUED | OUTPATIENT
Start: 2025-08-06 | End: 2025-08-06

## 2025-08-06 RX ORDER — LATANOPROST PF 0.05 MG/ML
1 SOLUTION/ DROPS OPHTHALMIC AT BEDTIME
Refills: 0 | Status: DISCONTINUED | OUTPATIENT
Start: 2025-08-06 | End: 2025-08-06

## 2025-08-06 RX ORDER — BRIMONIDINE TARTRATE 1.5 MG/ML
1 SOLUTION/ DROPS OPHTHALMIC
Refills: 0 | Status: DISCONTINUED | OUTPATIENT
Start: 2025-08-06 | End: 2025-08-06

## 2025-08-06 RX ORDER — BRIMONIDINE TARTRATE 1.5 MG/ML
1 SOLUTION/ DROPS OPHTHALMIC ONCE
Refills: 0 | Status: COMPLETED | OUTPATIENT
Start: 2025-08-06 | End: 2025-08-06

## 2025-08-06 RX ORDER — METFORMIN HYDROCHLORIDE 850 MG/1
1000 TABLET ORAL ONCE
Refills: 0 | Status: COMPLETED | OUTPATIENT
Start: 2025-08-06 | End: 2025-08-06

## 2025-08-06 RX ORDER — DORZOLAMIDE HYDROCHLORIDE AND TIMOLOL MALEATE 20; 5 MG/ML; MG/ML
1 SOLUTION/ DROPS OPHTHALMIC
Refills: 0 | Status: DISCONTINUED | OUTPATIENT
Start: 2025-08-06 | End: 2025-08-06

## 2025-08-06 RX ORDER — TETRACAINE HYDROCHLORIDE 5 MG/ML
1 SOLUTION OPHTHALMIC ONCE
Refills: 0 | Status: COMPLETED | OUTPATIENT
Start: 2025-08-06 | End: 2025-08-06

## 2025-08-06 RX ORDER — DORZOLAMIDE HYDROCHLORIDE AND TIMOLOL MALEATE 20; 5 MG/ML; MG/ML
1 SOLUTION/ DROPS OPHTHALMIC ONCE
Refills: 0 | Status: COMPLETED | OUTPATIENT
Start: 2025-08-06 | End: 2025-08-06

## 2025-08-06 RX ORDER — ACETAMINOPHEN 500 MG/5ML
1000 LIQUID (ML) ORAL ONCE
Refills: 0 | Status: COMPLETED | OUTPATIENT
Start: 2025-08-06 | End: 2025-08-06

## 2025-08-06 RX ADMIN — ACETAZOLAMIDE 500 MILLIGRAM(S): 250 TABLET ORAL at 23:30

## 2025-08-06 RX ADMIN — DORZOLAMIDE HYDROCHLORIDE AND TIMOLOL MALEATE 1 DROP(S): 20; 5 SOLUTION/ DROPS OPHTHALMIC at 15:36

## 2025-08-06 RX ADMIN — Medication 1000 MILLIGRAM(S): at 16:36

## 2025-08-06 RX ADMIN — ACETAZOLAMIDE 110 MILLIGRAM(S): 250 TABLET ORAL at 15:37

## 2025-08-06 RX ADMIN — METFORMIN HYDROCHLORIDE 1000 MILLIGRAM(S): 850 TABLET ORAL at 23:29

## 2025-08-06 RX ADMIN — BRIMONIDINE TARTRATE 1 DROP(S): 1.5 SOLUTION/ DROPS OPHTHALMIC at 15:25

## 2025-08-06 RX ADMIN — Medication 1000 MILLILITER(S): at 14:28

## 2025-08-06 RX ADMIN — LATANOPROST PF 1 DROP(S): 0.05 SOLUTION/ DROPS OPHTHALMIC at 15:40

## 2025-08-07 VITALS
SYSTOLIC BLOOD PRESSURE: 138 MMHG | RESPIRATION RATE: 18 BRPM | HEART RATE: 56 BPM | DIASTOLIC BLOOD PRESSURE: 78 MMHG | OXYGEN SATURATION: 98 % | TEMPERATURE: 99 F

## 2025-08-07 RX ORDER — ACETAZOLAMIDE 250 MG/1
1 TABLET ORAL
Qty: 1 | Refills: 0
Start: 2025-08-07

## 2025-08-07 RX ADMIN — Medication 1 DROP(S): at 01:25

## 2025-08-11 ENCOUNTER — NON-APPOINTMENT (OUTPATIENT)
Age: 65
End: 2025-08-11

## 2025-08-11 ENCOUNTER — APPOINTMENT (OUTPATIENT)
Dept: OPHTHALMOLOGY | Facility: CLINIC | Age: 65
End: 2025-08-11
Payer: COMMERCIAL

## 2025-08-11 PROCEDURE — 92133 CPTRZD OPH DX IMG PST SGM ON: CPT

## 2025-08-11 PROCEDURE — 76512 OPH US DX B-SCAN: CPT | Mod: RT

## 2025-08-11 PROCEDURE — 92136 OPHTHALMIC BIOMETRY: CPT

## 2025-08-11 PROCEDURE — 92004 COMPRE OPH EXAM NEW PT 1/>: CPT

## 2025-08-11 PROCEDURE — 76514 ECHO EXAM OF EYE THICKNESS: CPT

## 2025-08-11 PROCEDURE — 92020 GONIOSCOPY: CPT

## 2025-08-15 ENCOUNTER — APPOINTMENT (OUTPATIENT)
Dept: OPHTHALMOLOGY | Facility: CLINIC | Age: 65
End: 2025-08-15
Payer: COMMERCIAL

## 2025-08-15 PROCEDURE — 99214 OFFICE O/P EST MOD 30 MIN: CPT

## 2025-08-15 PROCEDURE — 76519 ECHO EXAM OF EYE: CPT
